# Patient Record
Sex: FEMALE | Race: WHITE
[De-identification: names, ages, dates, MRNs, and addresses within clinical notes are randomized per-mention and may not be internally consistent; named-entity substitution may affect disease eponyms.]

---

## 2019-07-03 ENCOUNTER — HOSPITAL ENCOUNTER (OUTPATIENT)
Dept: HOSPITAL 91 - MS1 | Age: 24
Setting detail: OBSERVATION
LOS: 1 days | Discharge: HOME | End: 2019-07-04
Payer: COMMERCIAL

## 2019-07-03 ENCOUNTER — HOSPITAL ENCOUNTER (OUTPATIENT)
Dept: HOSPITAL 10 - E/R | Age: 24
Setting detail: OBSERVATION
LOS: 1 days | Discharge: HOME | End: 2019-07-04
Attending: OBSTETRICS & GYNECOLOGY | Admitting: OBSTETRICS & GYNECOLOGY
Payer: COMMERCIAL

## 2019-07-03 VITALS
BODY MASS INDEX: 29.93 KG/M2 | WEIGHT: 190.7 LBS | HEIGHT: 67 IN | WEIGHT: 190.7 LBS | BODY MASS INDEX: 29.93 KG/M2 | HEIGHT: 67 IN

## 2019-07-03 VITALS — DIASTOLIC BLOOD PRESSURE: 56 MMHG | HEART RATE: 60 BPM | SYSTOLIC BLOOD PRESSURE: 105 MMHG | RESPIRATION RATE: 17 BRPM

## 2019-07-03 DIAGNOSIS — O03.6: Primary | ICD-10-CM

## 2019-07-03 LAB
ABNORMAL IP MESSAGE: 1
ADD MAN DIFF?: NO
ALANINE AMINOTRANSFERASE: 11 IU/L (ref 13–69)
ALBUMIN/GLOBULIN RATIO: 1.13
ALBUMIN: 4.3 G/DL (ref 3.3–4.9)
ALKALINE PHOSPHATASE: 72 IU/L (ref 42–121)
ANION GAP: 10 (ref 5–13)
ASPARTATE AMINO TRANSFERASE: 27 IU/L (ref 15–46)
BASOPHIL #: 0 10^3/UL (ref 0–0.1)
BASOPHILS %: 0.3 % (ref 0–2)
BILIRUBIN,DIRECT: 0 MG/DL (ref 0–0.2)
BILIRUBIN,TOTAL: 0.4 MG/DL (ref 0.2–1.3)
BLOOD UREA NITROGEN: 13 MG/DL (ref 7–20)
CALCIUM: 9.4 MG/DL (ref 8.4–10.2)
CARBON DIOXIDE: 22 MMOL/L (ref 21–31)
CHLORIDE: 108 MMOL/L (ref 97–110)
CREATININE: 0.56 MG/DL (ref 0.44–1)
D-DIMER: 9527.16 NG/ML (ref ?–460)
EOSINOPHILS #: 0.1 10^3/UL (ref 0–0.5)
EOSINOPHILS %: 0.8 % (ref 0–7)
FIBRINOGEN: 468 MG/DL (ref 207–461)
GLOBULIN: 3.8 G/DL (ref 1.3–3.2)
GLUCOSE: 77 MG/DL (ref 70–220)
HEMATOCRIT: 42.7 % (ref 37–47)
HEMOGLOBIN: 14.5 G/DL (ref 12–16)
IMMATURE GRANS #M: 0.03 10^3/UL (ref 0–0.03)
IMMATURE GRANS % (M): 0.3 % (ref 0–0.43)
INR: 0.91
LYMPHOCYTES #: 1.6 10^3/UL (ref 0.8–2.9)
LYMPHOCYTES %: 16.3 % (ref 15–51)
MEAN CORPUSCULAR HEMOGLOBIN: 29.2 PG (ref 29–33)
MEAN CORPUSCULAR HGB CONC: 34 G/DL (ref 32–37)
MEAN CORPUSCULAR VOLUME: 86.1 FL (ref 82–101)
MEAN PLATELET VOLUME: 13.4 FL (ref 7.4–10.4)
MONOCYTE #: 0.6 10^3/UL (ref 0.3–0.9)
MONOCYTES %: 5.7 % (ref 0–11)
NEUTROPHIL #: 7.4 10^3/UL (ref 1.6–7.5)
NEUTROPHILS %: 76.6 % (ref 39–77)
NUCLEATED RED BLOOD CELLS #: 0 10^3/UL (ref 0–0)
NUCLEATED RED BLOOD CELLS%: 0 /100WBC (ref 0–0)
PARTIAL THROMBOPLASTIN TIME: 27 SEC (ref 23–35)
PLATELET COUNT: 133 10^3/UL (ref 140–415)
POSITIVE DIFF: (no result)
POTASSIUM: 4 MMOL/L (ref 3.5–5.1)
PROTIME: 12.4 SEC (ref 11.9–14.9)
PT RATIO: 1
RED BLOOD COUNT: 4.96 10^6/UL (ref 4.2–5.4)
RED CELL DISTRIBUTION WIDTH: 13.5 % (ref 11.5–14.5)
SODIUM: 140 MMOL/L (ref 135–144)
TOTAL PROTEIN: 8.1 G/DL (ref 6.1–8.1)
WHITE BLOOD COUNT: 9.7 10^3/UL (ref 4.8–10.8)

## 2019-07-03 PROCEDURE — 85025 COMPLETE CBC W/AUTO DIFF WBC: CPT

## 2019-07-03 PROCEDURE — 85730 THROMBOPLASTIN TIME PARTIAL: CPT

## 2019-07-03 PROCEDURE — 36415 COLL VENOUS BLD VENIPUNCTURE: CPT

## 2019-07-03 PROCEDURE — 84702 CHORIONIC GONADOTROPIN TEST: CPT

## 2019-07-03 PROCEDURE — 86900 BLOOD TYPING SEROLOGIC ABO: CPT

## 2019-07-03 PROCEDURE — 85610 PROTHROMBIN TIME: CPT

## 2019-07-03 PROCEDURE — 76801 OB US < 14 WKS SINGLE FETUS: CPT

## 2019-07-03 PROCEDURE — 99285 EMERGENCY DEPT VISIT HI MDM: CPT

## 2019-07-03 PROCEDURE — 85378 FIBRIN DEGRADE SEMIQUANT: CPT

## 2019-07-03 PROCEDURE — 86901 BLOOD TYPING SEROLOGIC RH(D): CPT

## 2019-07-03 PROCEDURE — 85384 FIBRINOGEN ACTIVITY: CPT

## 2019-07-03 PROCEDURE — 80053 COMPREHEN METABOLIC PANEL: CPT

## 2019-07-03 RX ADMIN — MISOPROSTOL SCH MCG: 200 TABLET ORAL at 23:56

## 2019-07-03 RX ADMIN — OXYTOCIN 1 MLS/HR: 10 INJECTION, SOLUTION INTRAMUSCULAR; INTRAVENOUS at 21:18

## 2019-07-03 RX ADMIN — THIAMINE HYDROCHLORIDE 1 MLS/HR: 100 INJECTION, SOLUTION INTRAMUSCULAR; INTRAVENOUS at 16:23

## 2019-07-03 RX ADMIN — POTASSIUM CHLORIDE SCH MLS/HR: 2 INJECTION, SOLUTION, CONCENTRATE INTRAVENOUS at 21:18

## 2019-07-03 RX ADMIN — MISOPROSTOL 1 MCG: 200 TABLET ORAL at 23:56

## 2019-07-03 NOTE — ERD
ER Documentation


Chief Complaint


Chief Complaint





[facility], [facility], vag bleeding uncontrolled





HPI


24-year-old female  Ab1 brought in by ambulance from [facility] 


after she had a suction  at 12 weeks gestational age.  She had 


uncontrolled vaginal bleeding so the device was placed to tamponade the bleeding


and she was sent to the ER.  It seems that the physician there called Dr. Christianson, 1 of our gynecologist here, who has been expecting the patient.  


Patient is not complaining of any abdominal pain, dizziness, headache, nausea.  


She states she does has mild discomfort from the Aguirre and the other tube in her


vagina.  Denies any history of bleeding disorders.  Not taking any NSAIDs or 


blood thinners.





ROS


All systems reviewed and are negative except as per history of present illness.





Medications


Home Meds


Active Scripts


Electrolyte,Oral (Pedialyte) 1,000 Ml Solution, 1000 ML PO Q6, #1000 ML


   Prov:TAY MCNULTY PA-C         18


Metoclopramide* (Reglan*) 10 Mg Tablet, 10 MG PO Q6 PRN for NAUSEA AND/OR 


VOMITING, #20 TAB


   Prov:TAY MCNULTY PA-C         18


Acetaminophen* (Tylophen*) 500 Mg Capsule, 1 CAP PO Q6H PRN for PAIN AND OR 


ELEVATED TEMP, #20 CAP


   Prov:KAYLEE RAYMOND PA-C         4/10/18


Clindamycin Hcl* (Clindamycin Hcl*) 300 Mg Capsule, 300 MG PO TID for 10 Days, 


CAP


   Prov:KAYLEE RAYMOND PA-C         4/10/18


Reported Medications


Nifedipine* (Procardia*) 20 Mg Cap, 20 MG PO Q6


   13


Prenatal Vits W-Ca,Fe,Fa(<1MG) (Prenatal) 1 Tab Tablet, 1 TAB PO DAILY


   13


[None]   No Conflict Check


   9/20/10





Allergies


Allergies:  


Coded Allergies:  


     Penicillins (Verified  Allergy, Unknown, 18)


Uncoded Allergies:  


     ALMONDS (Allergy, Severe, 13)


   


   SWELLING





PMhx/Soc


Medical and Surgical Hx:  pt denies Medical Hx, pt denies Surgical Hx


History of Surgery:  No


Anesthesia Reaction:  No


Hx Neurological Disorder:  No


Hx Respiratory Disorders:  No


Hx Cardiac Disorders:  No


Hx Psychiatric Problems:  No


Hx Miscellaneous Medical Probl:  No


Hx Alcohol Use:  No


Hx Substance Use:  No


Hx Tobacco Use:  No


Smoking Status:  Never smoker





FmHx


Family History:  No diabetes





Physical Exam


Vitals





Vital Signs


  Date      Temp  Pulse  Resp  B/P (MAP)   Pulse Ox  O2          O2 Flow    FiO2


Time                                                 Delivery    Rate


    7/3/19           69    15      104/67       100  Room Air


     18:15                           (79)


    7/3/19           64    14  87/67 (74)       100  Room Air


     18:00


    7/3/19           71    16      117/70       100  Room Air


     17:00                           (86)


    7/3/19           78    18      100/62       100  Room Air


     16:00                           (75)


    7/3/19  97.2     85    20      122/79        99


     15:54                           (93)





Physical Exam


Const:   No acute distress


Head:   Atraumatic 


Eyes:    Normal Conjunctiva


ENT:    Normal External Ears, Nose and Mouth.


Neck:               Full range of motion. No meningismus.


Resp:   Clear to auscultation bilaterally


Cardio:   Regular rate and rhythm, no murmurs.  2+ distal pulses


Abd:    Soft, non tender, non distended. uterine fundus palpable.  no bogginess 


of uterus. no masses. Normal bowel sounds


:    Vaginal tube with 300 ml blood in bag, no active bleeding. Aguirre in place


 draining clear yellow urine


Skin:   No petechiae or rashes


Back:   No midline or flank tenderness


Ext:    No cyanosis, or edema


Neur:   Awake and alert


Psych:    Normal Mood and Affect


Result Diagram:  


7/3/19 1624                                                                     


           7/3/19 1725





Results 24 hrs





Laboratory Tests


      Test
                                   7/3/19
16:24   7/3/19
17:25


      White Blood Count                       9.7 10^3/ul


      Red Blood Count                        4.96 10^6/ul


      Hemoglobin                                14.5 g/dl


      Hematocrit                                   42.7 %


      Mean Corpuscular Volume                     86.1 fl


      Mean Corpuscular Hemoglobin                 29.2 pg


      Mean Corpuscular Hemoglobin
Concent      34.0 g/dl 
  



      Red Cell Distribution Width                  13.5 %


      Platelet Count                          133 10^3/UL


      Mean Platelet Volume                        13.4 fl


      Immature Granulocytes %                     0.300 %


      Neutrophils %                                76.6 %


      Lymphocytes %                                16.3 %


      Monocytes %                                   5.7 %


      Eosinophils %                                 0.8 %


      Basophils %                                   0.3 %


      Nucleated Red Blood Cells %             0.0 /100WBC


      Immature Granulocytes #               0.030 10^3/ul


      Neutrophils #                           7.4 10^3/ul


      Lymphocytes #                           1.6 10^3/ul


      Monocytes #                             0.6 10^3/ul


      Eosinophils #                           0.1 10^3/ul


      Basophils #                             0.0 10^3/ul


      Nucleated Red Blood Cells #             0.0 10^3/ul


      Prothrombin Time                           12.4 Sec


      Prothrombin Time Ratio                          1.0


      INR International Normalized
Ratio            0.91 
  



      Activated Partial
Thromboplast Time       27.0 Sec 
  



      Fibrinogen                              468.0 mg/dl


      D-Dimer                               9527.16 ng/ml


      D-Dimer Comment


      Beta HCG, Quantitative               41408.0 mIU/ml


      Sodium Level                                            140 mmol/L


      Potassium Level                                         4.0 mmol/L


      Chloride Level                                          108 mmol/L


      Carbon Dioxide Level                                     22 mmol/L


      Anion Gap                                                       10


      Blood Urea Nitrogen                                       13 mg/dl


      Creatinine                                              0.56 mg/dl


      Est Glomerular Filtrat Rate
mL/min   
                > 60 mL/min 



      Glucose Level                                             77 mg/dl


      Calcium Level                                            9.4 mg/dl


      Total Bilirubin                                          0.4 mg/dl


      Direct Bilirubin                                        0.00 mg/dl


      Indirect Bilirubin                                       0.4 mg/dl


      Aspartate Amino Transf
(AST/SGOT)    
                    27 IU/L 



      Alanine Aminotransferase
(ALT/SGPT)  
                    11 IU/L 



      Alkaline Phosphatase                                       72 IU/L


      Total Protein                                             8.1 g/dl


      Albumin                                                   4.3 g/dl


      Globulin                                                 3.80 g/dl


      Albumin/Globulin Ratio                                        1.13





Current Medications


 Medications
   Dose
          Sig/Merly
       Start Time
   Status  Last


 (Trade)       Ordered        Route
 PRN     Stop Time              Admin
Dose


                              Reason                                Admin


 Sodium         1,000 ml @ 
   Q1H STAT
      7/3/19        DC            7/3/19


Chloride       1,000 mls/hr   IV
            16:13
 7/3/19                16:23



                                             17:12


 Ondansetron    4 mg           [facility] ORDER   7/3/19                 



HCl
  (Zofran                 PRN
 IV
       18:00
 19


Inj)                          NAUSEA/VOMITI  17:59


                              NG


                650 mg         [facility]      7/3/19                 



Acetaminophen                 PRN
 PO
       18:00
 19



  (Tylenol                   .MILD PAIN     17:59


Tab)                          1-3 OR TEMP








Procedures/MDM


EMERGENT LABS AND DIAGNOSTIC STUDIES: 


Lab Results above were reviewed and interpreted by me. 





CBC: Mild thrombocytopenia, unclear etiology.  No anemia or evidence of 


infection


CMP: No evidence of clinically significant electrolyte abnormality, acidosis, 


renal failure, hypoglycemia, liver disease, or biliary obstruction


Coags within normal limits


Fibrinogen and d-dimer elevated, expected pregnancy


___________________________________________________________ 


Radiology Results as interpreted by Radiology below were reviewed by ALLI Hameed MD: 





Pelvic ultrasound:


IMPRESSION:


Previously seen gestational sac is no longer visualized.


The findings likely represent an  in progress.


Heterogeneous and thickened endometrium with no increased vascularity.


Aguirre catheter noted within the endometrium.


Follow-up ultrasound and HCG levels is recommended.


___________________________________________________________ 


Initial Nursing notes reviewed. 


Previous Medical Records requested via the Electronic Health Record. 





EMERGENCY DEPARTMENT COURSE / MEDICAL DECISION MAKING: 





Patient is presenting with uterine hemorrhage after an , currently 


hemodynamically stable.  There is no signs of active bleeding into the bag of 


the Batri balloon.  I spoke with the OB/GYN on-call, who plans to admit the pa


tient for observation.  At this time there is no evidence of life-threatening 


anemia or continuous bleeding.








Accepting Care Team:


Current data and ongoing care discussed.


Time:                      Time of admission


Primary Provider:      Dr. Christianson with OBGYN


Outstanding Data:       none





Departure


Diagnosis:  


   Primary Impression:  


   Vaginal delivery with potential for hemorrhage


   Additional Impression:  


   Status post elective 


Condition:  Serious











JANNIE HAMEED MD          Jul 3, 2019 17:10

## 2019-07-04 VITALS — HEART RATE: 63 BPM | DIASTOLIC BLOOD PRESSURE: 62 MMHG | SYSTOLIC BLOOD PRESSURE: 118 MMHG | RESPIRATION RATE: 17 BRPM

## 2019-07-04 VITALS — RESPIRATION RATE: 18 BRPM | DIASTOLIC BLOOD PRESSURE: 56 MMHG | HEART RATE: 80 BPM | SYSTOLIC BLOOD PRESSURE: 109 MMHG

## 2019-07-04 VITALS — SYSTOLIC BLOOD PRESSURE: 110 MMHG | DIASTOLIC BLOOD PRESSURE: 70 MMHG | HEART RATE: 79 BPM | RESPIRATION RATE: 18 BRPM

## 2019-07-04 VITALS — HEART RATE: 68 BPM | DIASTOLIC BLOOD PRESSURE: 64 MMHG | RESPIRATION RATE: 20 BRPM | SYSTOLIC BLOOD PRESSURE: 115 MMHG

## 2019-07-04 RX ADMIN — MISOPROSTOL 1 MCG: 200 TABLET ORAL at 19:13

## 2019-07-04 RX ADMIN — POTASSIUM CHLORIDE SCH MLS/HR: 2 INJECTION, SOLUTION, CONCENTRATE INTRAVENOUS at 13:00

## 2019-07-04 RX ADMIN — MISOPROSTOL SCH MCG: 200 TABLET ORAL at 12:39

## 2019-07-04 RX ADMIN — MISOPROSTOL SCH MCG: 200 TABLET ORAL at 19:13

## 2019-07-04 RX ADMIN — MISOPROSTOL SCH MCG: 200 TABLET ORAL at 05:42

## 2019-07-04 RX ADMIN — POTASSIUM CHLORIDE SCH MLS/HR: 2 INJECTION, SOLUTION, CONCENTRATE INTRAVENOUS at 05:36

## 2019-07-04 RX ADMIN — OXYTOCIN 1 MLS/HR: 10 INJECTION, SOLUTION INTRAMUSCULAR; INTRAVENOUS at 05:36

## 2019-07-04 RX ADMIN — OXYTOCIN 1 MLS/HR: 10 INJECTION, SOLUTION INTRAMUSCULAR; INTRAVENOUS at 13:00

## 2019-07-04 RX ADMIN — MISOPROSTOL 1 MCG: 200 TABLET ORAL at 05:42

## 2019-07-04 RX ADMIN — AZITHROMYCIN 1 MG: 500 TABLET, FILM COATED ORAL at 05:44

## 2019-07-04 RX ADMIN — MISOPROSTOL 1 MCG: 200 TABLET ORAL at 12:39

## 2019-07-04 NOTE — DS
Date/Time of Note


Date/Time of Note


DATE: 19 


TIME: 16:47





Discharge Summary


Admission/Discharge Info


Admit Date/Time


Jul 3, 2019 at 17:37


Discharge Date/Time


2019


Discharge Diagnosis


Post abortal hemorrhage


Patient Condition:  Good


Consults


Gynecology


Procedures


Observation


Hx of Present Illness


24-year-old G3, P2 with recent episode of pregnancy at 12 weeks and 5 days 


underwent therapeutic  with D&C at [facility] yesterday she was 


noted to have continued hemorrhage after D&C.  Bactrim balloon was placed inside


the uterus as well as vaginal pack and patient was transferred then to [facility].


Patient was kept overnight for observation.  Cytotec started p.o. every 6 hours 


with continuous monitoring urine output and vaginal bleeding.  She remained 


stable during observation.  Intrauterine catheter as well as urethral catheter 


was removed less than 24 hours and patient was noted to be stable with minimal 


vaginal bleeding.  She denied any pain or symptoms.  She could ambulate without 


any problem.  Her vitals was stable and hemoglobin was normal.  She was given a 


dose of azithromycin as well for prophylaxis treatment due to D&C.


She was afebrile.  Patient was discharged home in stable condition with a 


prescription of Loestrin and follow-up within a week with her primary OB office.


 She was advised to start birth control the same day after discharge from the 


hospital.


Hospital Course


None complicated


Home Meds


Active Scripts


Electrolyte,Oral (Pedialyte) 1,000 Ml Solution, 1000 ML PO Q6, #1000 ML


   Prov:TAY MCNULTY PA-C         18


Metoclopramide* (Reglan*) 10 Mg Tablet, 10 MG PO Q6 PRN for NAUSEA AND/OR 


VOMITING, #20 TAB


   Prov:TAY MCNULTY PA-C         18


Acetaminophen* (Tylophen*) 500 Mg Capsule, 1 CAP PO Q6H PRN for PAIN AND OR 


ELEVATED TEMP, #20 CAP


   Prov:KAYLEE RAYMOND PA-C         4/10/18


Clindamycin Hcl* (Clindamycin Hcl*) 300 Mg Capsule, 300 MG PO TID for 10 Days, 


CAP


   Prov:KAYELE RAYMOND PA-C         4/10/18


Reported Medications


Nifedipine* (Procardia*) 20 Mg Cap, 20 MG PO Q6


   13


Prenatal Vits W-Ca,Fe,Fa(<1MG) (Prenatal) 1 Tab Tablet, 1 TAB PO DAILY


   13


[None]   No Conflict Check


   9/20/10


Follow-up Plan


1 week with primary OB office or sooner as needed


Primary Care Provider


Not On Staff Doctor


Time spent on discharge:   > 30 minutes


Pending Labs





Laboratory Tests


          Test
                                          7/3/19
17:25


          Sodium Level
                          140 mmol/L
(135-144)


          Potassium Level
                       4.0 mmol/L
(3.5-5.1)


          Chloride Level
                         108 mmol/L
()


          Carbon Dioxide Level
                     22 mmol/L
(21-31)


          Anion Gap                                        10 (5-13)


          Blood Urea Nitrogen
                        13 mg/dl
(7-20)


          Creatinine
                          0.56 mg/dl
(0.44-1.00)


          Est Glomerular Filtrat Rate
mL/min   > 60 mL/min
(>60)


          Glucose Level
                            77 mg/dl
()


          Calcium Level
                         9.4 mg/dl
(8.4-10.2)


          Total Bilirubin
                        0.4 mg/dl
(0.2-1.3)


          Direct Bilirubin
                    0.00 mg/dl
(0.00-0.20)


          Indirect Bilirubin
                       0.4 mg/dl
(0-1.1)


          Aspartate Amino Transf
(AST/SGOT)           27 IU/L
(15-46)


          Alanine Aminotransferase
(ALT/SGPT)         11 IU/L
(13-69)


          Alkaline Phosphatase
                      72 IU/L
()


          Total Protein
                           8.1 g/dl
(6.1-8.1)


          Albumin
                                 4.3 g/dl
(3.3-4.9)


          Globulin
                               3.80 g/dl
(1.3-3.2)


          Albumin/Globulin Ratio                                1.13














JANESSA PRADO MD               2019 16:50

## 2019-07-04 NOTE — CONS
Assessment/Plan


Assessment/Plan


Problems:  


(1) Status post elective 


Status:  Acute


Assessment/Plan (Daily)


Status post therapeutic  at 12 weeks and 5 days by D&C


Post abortal hemorrhage.  Status post Backery balloon placement after exhaustion


of medical treatment


Patient currently hemodynamically stable.  There is no active bleeding 


currently.  Vaginal pack removed.


We will continue to monitor closely.  Plan to deflate the back to balloon 


gradually and to watch for any further bleeding.


Start Cytotec 200 mcg every 4 hours p.o.


Azithromycin 1 gram x 1 


Strict pad counts.  Discussed plan of care with RN.


If bleeding controlled and patient hemodynamically stable and no active bleeding


after removing of balloon may discharge home with contraception.  Contraceptive 


option discussed.  Patient will make a decision and will let me know





Consultation Date/Type/Reason


Admit Date/Time


Jul 3, 2019 at 17:37


Date of Consultation:  2019


Type of Consult


GYN


Reason for Consultation


Continuous vaginal bleeding after termination of pregnancy at 12 weeks at 


[facility]


Date/Time of Note


DATE: 19 


TIME: 04:03





Hx of Present Illness


24-year-old  with pregnancy at 12 weeks and 5 days, that is post 


termination by D&C at [facility] and [place].  Patient was transferred 


from [facility] after noted to have continued vaginal bleeding.  


Apparently due to continuous bleeding, a Backery  balloon was placed inside the 


uterus for tamponade, as well as a vaginal pack.  Patient was then transferred 


to [facility].  Patient also has a Aguirre catheter for 


measuring urine output.


Patient was admitted for observation and evaluation of heavy vaginal bleeding 


post therapeutic .


Patient currently denies any shortness of breath, chest pain, dizziness, 


lightheadedness.  Comfortable in bed.  Reports her bleeding significantly 


decreased since admission.


She has a good appetite.


She is breast-feeding.  She conceived while she was breast-feeding.  She is a 


status post  on 2019.  Was not using any contraception.  Reports her


last menstrual period 2018 when she conceived with the last pregnancy.


Subjective hx not possible:  other


Constitutional:  no complaints, improved


Eyes:  no complaints, pain


ENT:  no complaints, bleeding


Respiratory:  no complaints, pain


Cardiovascular:  no complaints


Gastrointestinal:  no complaints


Genitourinary:  bleeding; 


   No no complaints, No dysuria, No discharge, No flank pain, No hematuria, No 


other


Musculoskeletal:  no complaints, back pain


Skin:  bruising, erythema


Neurologic:  no complaints


Endocrine:  no complaints


Lymphatic:  no complaints


Psychological:  no complaints





Past Medical History


Medical History:  no pertinent history


Home Meds


Active Scripts


Electrolyte,Oral (Pedialyte) 1,000 Ml Solution, 1000 ML PO Q6, #1000 ML


   Prov:TAY MCNULTY PA-C         18


Metoclopramide* (Reglan*) 10 Mg Tablet, 10 MG PO Q6 PRN for NAUSEA AND/OR 


VOMITING, #20 TAB


   Prov:TAY MCNULTY PA-C         18


Acetaminophen* (Tylophen*) 500 Mg Capsule, 1 CAP PO Q6H PRN for PAIN AND OR 


ELEVATED TEMP, #20 CAP


   Prov:KAYLEE RAYMOND PA-C         4/10/18


Clindamycin Hcl* (Clindamycin Hcl*) 300 Mg Capsule, 300 MG PO TID for 10 Days, 


CAP


   Prov:KAYLEE RAYMOND PA-C         4/10/18


Reported Medications


Nifedipine* (Procardia*) 20 Mg Cap, 20 MG PO Q6


   13


Prenatal Vits W-Ca,Fe,Fa(<1MG) (Prenatal) 1 Tab Tablet, 1 TAB PO DAILY


   13


[None]   No Conflict Check


   9/20/10


Medications





Current Medications


Dextrose/Lactated Ringer's 1,000 ml @  125 mls/hr Q8H IV  Last administered on 


7/3/19at 21:18; Admin Dose 125 MLS/HR;  Start 7/3/19 at 21:00


Misoprostol (Cytotec) 200 mcg Q6 PO  Last administered on 7/3/19at 23:56; Admin 


Dose 200 MCG;  Start 19 at 00:00


Allergies:  


Coded Allergies:  


     Penicillins (Verified  Allergy, Unknown, 18)


Uncoded Allergies:  


     ALMONDS (Allergy, Severe, 13)


   


   SWELLING





Past Surgical History


Past Surgical Hx:  no surgical history





Family History


Significant Family History:  no pertinent family hx





Social History


Alcohol Use:  none


Smoking Status:  Former smoker


Drug Use:  none





Exam/Review of Systems


Exam


Vitals





Vital Signs


  Date      Temp  Pulse  Resp  B/P (MAP)   Pulse Ox  O2          O2 Flow    FiO2


Time                                                 Delivery    Rate


    19  98.6     63    17      118/62        95  Room Air


     02:31                           (80)








Intake and Output





7/3/19


7/3/19


7/4/19





1515:00


23:00


07:00





OutputOutput Total


200 ml





BalanceBalance


-200 ml











Constitutional:  alert, oriented, well developed


Psych:  no complaints, nl mood/affect


Head:  normocephalic, atraumatic


Eyes:  nl conjunctiva, EOMI, nl lids


ENMT:  nl external ears & nose, nl lips & teeth, nl nasal mucosa & septum


Neck:  supple


Respiratory:  clear to auscultation, normal air movement


Cardiovascular:  regular rate and rhythm, nl pulses


Gastrointestinal:  soft, non-tender


Genitourinary - Female:  other (External genitalia within normal limits.  A 


Aguirre urinary catheter in place draining clear yellow urine as well as an 


intrauterine catheter with attached bag.  There is about 200 cc of blood in the 


uterine catheter noted.  There is evidence of a vaginal pack noted that soaked 


with blood.  After removing of vaginal pack speculum exam attempted.  There is a


 scant amount of blood in the vault.  Cervix and vagina normal.  No evidence of 


laceration noted.  A uterine catheter noted exiting from the cervix.  Speculum 


removed.  Bimanual examination: Uterus about 10 to 11 cm and nontender.  No 


fullness or tenderness in adnexa.  Abdomen: Soft, no tenderness no rebound 


tenderness, no guarding no rigidity soft.)


Extremities:  normal pulses


Neurological:  CNS II-XII intact, nl mental status, nl speech


Skin:  nl turgor, rash or lesions





Results


Result Diagram:  


7/3/19 1624                                                                     


           7/3/19 1725





Results 24hrs





Laboratory Tests


        Test
                                7/3/19
16:24  7/3/19
17:25


        White Blood Count                          9.7  #


        Red Blood Count                            4.96


        Hemoglobin                                 14.5


        Hematocrit                                 42.7


        Mean Corpuscular Volume                    86.1


        Mean Corpuscular Hemoglobin                29.2


        Mean Corpuscular Hemoglobin
Concent       34.0  
  



        Red Cell Distribution Width                13.5


        Platelet Count                            133  #L


        Mean Platelet Volume                      13.4  H


        Immature Granulocytes %                   0.300


        Neutrophils %                              76.6


        Lymphocytes %                              16.3


        Monocytes %                                 5.7


        Eosinophils %                               0.8


        Basophils %                                 0.3


        Nucleated Red Blood Cells %                 0.0


        Immature Granulocytes #                   0.030


        Neutrophils #                               7.4


        Lymphocytes #                               1.6


        Monocytes #                                 0.6


        Eosinophils #                               0.1


        Basophils #                                 0.0


        Nucleated Red Blood Cells #                 0.0


        Prothrombin Time                           12.4


        Prothrombin Time Ratio                      1.0


        INR International Normalized
Ratio        0.91  
  



        Activated Partial
Thromboplast Time       27.0  
  



        Fibrinogen                               468.0  H


        D-Dimer                                9527.16  H


        D-Dimer Comment


        Beta HCG, Quantitative                  06354.0


        Sodium Level                                              140


        Potassium Level                                           4.0


        Chloride Level                                            108


        Carbon Dioxide Level                                       22


        Anion Gap                                                  10


        Blood Urea Nitrogen                                        13


        Creatinine                                               0.56


        Est Glomerular Filtrat Rate
mL/min   
             > 60  



        Glucose Level                                              77


        Calcium Level                                             9.4


        Total Bilirubin                                           0.4


        Direct Bilirubin                                         0.00


        Indirect Bilirubin                                        0.4


        Aspartate Amino Transf
(AST/SGOT)    
                    27  



        Alanine Aminotransferase
(ALT/SGPT)  
                   11  L



        Alkaline Phosphatase                                       72


        Total Protein                                             8.1


        Albumin                                                   4.3


        Globulin                                                3.80  H


        Albumin/Globulin Ratio                                   1.13








Imaging


Imaging


                                        


PROCEDURE:   US Pelvis. 


 


CLINICAL INDICATION:   vaginal bleeding 


 


TECHNIQUE:   Multiple sonographic images of the pelvis were obtained utilizing a


 transabdominal technique.   The images were reviewed on a PACS workstation. 


 


COMPARISON:   None. 


 


FINDINGS:


The uterus is slightly enlarged in size and demonstrates a normal appearance of 


the myometrium.  


The uterus measures 11.7 x 7.3 x 9.3 cm in size.


The endometrial stripe is heterogeneous in appearance and has the thickness of 4


8 mm. There is no increased vascularity. There is a Aguirre catheter noted within 


the endometrium.


Previously seen gestational sac is no longer visualized.


The ovaries are not visualized.


No free fluid is present within the pelvis.. 


 


 


RPTAT: AA


 


 


IMPRESSION:


Previously seen gestational sac is no longer visualized.


The findings likely represent an  in progress.


Heterogeneous and thickened endometrium with no increased vascularity.


Aguirre catheter noted within the endometrium.


Follow-up ultrasound and HCG levels is recommended





Medications


Medication





Current Medications


Dextrose/Lactated Ringer's 1,000 ml @  125 mls/hr Q8H IV  Last administered on 


7/3/19at 21:18; Admin Dose 125 MLS/HR;  Start 7/3/19 at 21:00


Misoprostol (Cytotec) 200 mcg Q6 PO  Last administered on 7/3/19at 23:56; Admin 


Dose 200 MCG;  Start 19 at 00:00











JANESSA PRADO MD               2019 04:04

## 2019-07-04 NOTE — PD.PPDC
OB/GYN Discharge Instruction


Provider Information


Physician Information


Janessa Prado MD





Condition


                 [id number]
Patient Condition:  [id number]
Good








Diet


                [id number]
Diet:  [id number]
Resume Regular Diet








Activity/Restrictions


               [id number]
Restrictions:  [id number]
No Exercising


                                         No Lifting


                                         Nothing in the Vagina


                                         No [place]


                                         No Tampons, douche








Follow-up


Follow-up with Physician:  1, Week/Weeks


Provider Information:


Follow up in one week with primary OB or sooner PRN





Return to clinic for


         [id number]
GYN Instructions:  Phugo1e
Fever greater than 101


                                       Chills


                                       Worsening abdominal pain


                                       Excessive Vaginal Bleeding


                                       More than 2 pads per hour


                                       Unable to tolerate diet





Comment:


Discussed to start OCP after DC home today


Rx provided











JANESSA PRADO MD               Jul 4, 2019 16:47

## 2019-07-04 NOTE — QN
Documentation


Comment


Patient denies any complaint.  Comfortable in bed.  Reports minimal vaginal 


bleeding.  Denies any abdominal pain.  Denies any fever or chills.  Denies any 


cramps.





Physical examination: General appears alert and oriented x4 does not appear to 


be in any acute distress


Abdomen: Soft, no tenderness, no rebound tenderness, no guarding, no rigidity


Extremities: No calf tenderness, no click no edema








Aguirre balloon, intrauterine with a total of 200 cc since admission dark blood.


Balloon deflated.  After aspiration 20 cc of saline the balloon was removed.


Urethral catheter also deflated and removed.  Adequate clear yellow urine noted.





Assessment


Status post therapeutic , D&C complicated by postop portal hemorrhage 


and [facility]


Bleeding stopped after placement of packed balloon.


Patient had a vaginal pack that removed last night


Intrauterine Aguirre balloon and urethral balloon removed.


Patient currently hemodynamically stable.  No bleeding since last night.  Had 


been on Cytotec 200 mg every 6 hours.





We will continue watch closely.


Anticipate discharge this afternoon if she is able to ambulate without symptoms 


and bleeding continued to be low-grade/minimal follow-up with primary GYN office


within a week after discharge from the hospital


Discussed regarding birth control To be started immediately after discharge from


the hospital


Plan of care discussed with JANESSA PALMA MD               2019 16:46